# Patient Record
Sex: FEMALE | Race: WHITE | Employment: FULL TIME | URBAN - METROPOLITAN AREA
[De-identification: names, ages, dates, MRNs, and addresses within clinical notes are randomized per-mention and may not be internally consistent; named-entity substitution may affect disease eponyms.]

---

## 2020-02-11 ENCOUNTER — OFFICE VISIT (OUTPATIENT)
Dept: FAMILY MEDICINE | Facility: OTHER | Age: 30
End: 2020-02-11

## 2020-02-11 VITALS
TEMPERATURE: 98.9 F | WEIGHT: 278 LBS | OXYGEN SATURATION: 100 % | RESPIRATION RATE: 16 BRPM | SYSTOLIC BLOOD PRESSURE: 124 MMHG | DIASTOLIC BLOOD PRESSURE: 80 MMHG | HEART RATE: 80 BPM

## 2020-02-11 DIAGNOSIS — M75.81 TENDINITIS OF RIGHT PECTORALIS MAJOR: Primary | ICD-10-CM

## 2020-02-11 PROCEDURE — 99203 OFFICE O/P NEW LOW 30 MIN: CPT | Performed by: NURSE PRACTITIONER

## 2020-02-11 ASSESSMENT — PAIN SCALES - GENERAL: PAINLEVEL: SEVERE PAIN (6)

## 2020-02-11 NOTE — PROGRESS NOTES
Subjective     Meme Kaur is a 29 year old female who presents to clinic today for the following health issues:    HPI   Concern - Right shoulder pain   Onset: 3 weeks     Description:   No known injury, did start lifting weights at the gym but no memorable strain or injury.     Pain on the inside of the collar bone with lifting of using the arm. Decreased ROM. Radiates up the neck a little bit.     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 She states she stopped working out with weights as this made symptoms worse.     There is no problem list on file for this patient.    History reviewed. No pertinent surgical history.    Social History     Tobacco Use     Smoking status: Never Smoker     Smokeless tobacco: Never Used   Substance Use Topics     Alcohol use: Not Currently     History reviewed. No pertinent family history.      No current outpatient medications on file.     No Known Allergies  No lab results found.   BP Readings from Last 3 Encounters:   02/11/20 124/80    Wt Readings from Last 3 Encounters:   02/11/20 126.1 kg (278 lb)        Reviewed and updated as needed this visit by Provider       MARIELENA COMP: Constitutional, HEENT, cardiovascular, pulmonary, GI, , musculoskeletal, neuro, skin, endocrine and psych systems are negative, except as otherwise noted.      Objective    /80   Pulse 80   Temp 98.9  F (37.2  C)   Resp 16   Wt 126.1 kg (278 lb)   SpO2 100%   There is no height or weight on file to calculate BMI.  Physical Exam   GENERAL: healthy, alert and no distress  NECK: no adenopathy, no asymmetry, masses, or scars and thyroid normal to palpation  RESP: lungs clear to auscultation - no rales, rhonchi or wheezes  CV: regular rate and rhythm,  normal S1 S2, no S3 or S4, no murmur, click or rub, no peripheral edema and peripheral pulses strong  MS:   SHOULDER Exam-Right   Inspection: no swelling, no bruising, no discoloration, no obvious deformity, no asymmetry, no glenohumeral joint anterior bulge, no distal clavicle elevation, no muscle atrophy, no scapular winging   Tenderness of: of right pectoralis muscle at tendon site near bicep with ROM      SKIN: no suspicious lesions or rashes    Assessment & Plan     1. Tendinitis of right pectoralis major    - PHYSICAL THERAPY REFERRAL; Future  Discussed plan of care see AVS. Home care instructions were reviewed with the patient. The risks, benefits and treatment options of prescribed medications or other treatments have been discussed with the patient. The patient verbalized their understanding and should call or follow up if no improvement or if they develop further problems.         Patient Instructions   Recommend ice massage every 3-4 hours as discussed. Ibuprofen 600 mg every 6 hours as needed for pain and inflammation always with food.     Physical therapy will call you to schedule.     Thank you  Alessandra Lazar [facility]

## 2020-02-11 NOTE — PATIENT INSTRUCTIONS
Recommend ice massage every 3-4 hours as discussed. Ibuprofen 600 mg every 6 hours as needed for pain and inflammation always with food.     Physical therapy will call you to schedule.     Thank you  Alessandra Lazar CNP